# Patient Record
Sex: FEMALE | Race: WHITE | NOT HISPANIC OR LATINO
[De-identification: names, ages, dates, MRNs, and addresses within clinical notes are randomized per-mention and may not be internally consistent; named-entity substitution may affect disease eponyms.]

---

## 2017-02-02 ENCOUNTER — APPOINTMENT (OUTPATIENT)
Dept: OBGYN | Facility: CLINIC | Age: 47
End: 2017-02-02

## 2017-02-02 VITALS
BODY MASS INDEX: 39.95 KG/M2 | SYSTOLIC BLOOD PRESSURE: 116 MMHG | WEIGHT: 234 LBS | DIASTOLIC BLOOD PRESSURE: 72 MMHG | HEIGHT: 64 IN

## 2017-02-02 DIAGNOSIS — F41.9 ANXIETY DISORDER, UNSPECIFIED: ICD-10-CM

## 2017-02-02 DIAGNOSIS — Z87.42 PERSONAL HISTORY OF OTHER DISEASES OF THE FEMALE GENITAL TRACT: ICD-10-CM

## 2017-02-02 DIAGNOSIS — Z72.3 LACK OF PHYSICAL EXERCISE: ICD-10-CM

## 2017-02-02 DIAGNOSIS — Z80.3 FAMILY HISTORY OF MALIGNANT NEOPLASM OF BREAST: ICD-10-CM

## 2017-02-02 DIAGNOSIS — Z83.3 FAMILY HISTORY OF DIABETES MELLITUS: ICD-10-CM

## 2017-02-02 DIAGNOSIS — N92.0 EXCESSIVE AND FREQUENT MENSTRUATION WITH REGULAR CYCLE: ICD-10-CM

## 2017-02-02 DIAGNOSIS — Z80.49 FAMILY HISTORY OF MALIGNANT NEOPLASM OF OTHER GENITAL ORGANS: ICD-10-CM

## 2017-02-02 DIAGNOSIS — F15.90 OTHER STIMULANT USE, UNSPECIFIED, UNCOMPLICATED: ICD-10-CM

## 2017-02-02 DIAGNOSIS — Z82.49 FAMILY HISTORY OF ISCHEMIC HEART DISEASE AND OTHER DISEASES OF THE CIRCULATORY SYSTEM: ICD-10-CM

## 2017-02-02 DIAGNOSIS — Z86.59 PERSONAL HISTORY OF OTHER MENTAL AND BEHAVIORAL DISORDERS: ICD-10-CM

## 2017-02-02 DIAGNOSIS — Z81.1 FAMILY HISTORY OF ALCOHOL ABUSE AND DEPENDENCE: ICD-10-CM

## 2017-02-02 DIAGNOSIS — E78.1 PURE HYPERGLYCERIDEMIA: ICD-10-CM

## 2017-02-02 LAB
DATE COLLECTED: NORMAL
HEMOCCULT SP1 STL QL: NEGATIVE
QUALITY CONTROL: YES

## 2017-02-02 RX ORDER — ZIPRASIDONE 20 MG/1
20 CAPSULE ORAL
Refills: 0 | Status: ACTIVE | COMMUNITY

## 2017-02-02 RX ORDER — OMEGA-3-ACID ETHYL ESTERS 1 G/1
1 CAPSULE, LIQUID FILLED ORAL
Refills: 0 | Status: ACTIVE | COMMUNITY

## 2017-02-02 RX ORDER — THYROID 90 MG
97.2 TABLET ORAL
Refills: 0 | Status: ACTIVE | COMMUNITY

## 2017-02-02 RX ORDER — SERTRALINE HYDROCHLORIDE 100 MG/1
100 TABLET, FILM COATED ORAL
Refills: 0 | Status: ACTIVE | COMMUNITY

## 2017-02-02 RX ORDER — VALACYCLOVIR HYDROCHLORIDE 500 MG/1
500 TABLET, FILM COATED ORAL
Refills: 0 | Status: ACTIVE | COMMUNITY

## 2017-02-07 LAB
C TRACH DNA SPEC QL NAA+PROBE: NORMAL
C TRACH RRNA SPEC QL NAA+PROBE: NORMAL
CYTOLOGY CVX/VAG DOC THIN PREP: NORMAL
HPV HIGH+LOW RISK DNA PNL CVX: NEGATIVE
N GONORRHOEA DNA SPEC QL NAA+PROBE: NORMAL
N GONORRHOEA RRNA SPEC QL NAA+PROBE: NORMAL
SOURCE TP AMPLIFICATION: NORMAL

## 2017-02-20 ENCOUNTER — APPOINTMENT (OUTPATIENT)
Dept: OBGYN | Facility: CLINIC | Age: 47
End: 2017-02-20

## 2017-03-13 ENCOUNTER — APPOINTMENT (OUTPATIENT)
Dept: DERMATOLOGY | Facility: CLINIC | Age: 47
End: 2017-03-13

## 2017-05-31 ENCOUNTER — APPOINTMENT (OUTPATIENT)
Dept: DERMATOLOGY | Facility: CLINIC | Age: 47
End: 2017-05-31

## 2017-07-10 ENCOUNTER — APPOINTMENT (OUTPATIENT)
Dept: OBGYN | Facility: CLINIC | Age: 47
End: 2017-07-10

## 2017-07-10 ENCOUNTER — ASOB RESULT (OUTPATIENT)
Age: 47
End: 2017-07-10

## 2018-03-14 ENCOUNTER — APPOINTMENT (OUTPATIENT)
Dept: OBGYN | Facility: CLINIC | Age: 48
End: 2018-03-14
Payer: COMMERCIAL

## 2018-03-14 VITALS
BODY MASS INDEX: 43.36 KG/M2 | WEIGHT: 254 LBS | DIASTOLIC BLOOD PRESSURE: 70 MMHG | HEIGHT: 64 IN | SYSTOLIC BLOOD PRESSURE: 120 MMHG

## 2018-03-14 LAB
DATE COLLECTED: NORMAL
HEMOCCULT SP1 STL QL: NEGATIVE
QUALITY CONTROL: YES

## 2018-03-14 PROCEDURE — 82270 OCCULT BLOOD FECES: CPT

## 2018-03-14 PROCEDURE — 99396 PREV VISIT EST AGE 40-64: CPT

## 2018-03-19 LAB
C TRACH RRNA SPEC QL NAA+PROBE: NOT DETECTED
CYTOLOGY CVX/VAG DOC THIN PREP: NORMAL
HPV HIGH+LOW RISK DNA PNL CVX: NOT DETECTED
N GONORRHOEA RRNA SPEC QL NAA+PROBE: NOT DETECTED
SOURCE TP AMPLIFICATION: NORMAL

## 2018-07-16 ENCOUNTER — APPOINTMENT (OUTPATIENT)
Dept: OBGYN | Facility: CLINIC | Age: 48
End: 2018-07-16
Payer: COMMERCIAL

## 2018-07-16 ENCOUNTER — ASOB RESULT (OUTPATIENT)
Age: 48
End: 2018-07-16

## 2018-07-16 PROCEDURE — 76857 US EXAM PELVIC LIMITED: CPT

## 2018-07-16 PROCEDURE — 76830 TRANSVAGINAL US NON-OB: CPT

## 2019-03-25 ENCOUNTER — APPOINTMENT (OUTPATIENT)
Dept: OBGYN | Facility: CLINIC | Age: 49
End: 2019-03-25
Payer: COMMERCIAL

## 2019-03-25 VITALS
HEIGHT: 64 IN | SYSTOLIC BLOOD PRESSURE: 130 MMHG | DIASTOLIC BLOOD PRESSURE: 70 MMHG | WEIGHT: 253 LBS | BODY MASS INDEX: 43.19 KG/M2

## 2019-03-25 DIAGNOSIS — Z12.11 ENCOUNTER FOR SCREENING FOR MALIGNANT NEOPLASM OF COLON: ICD-10-CM

## 2019-03-25 DIAGNOSIS — R92.2 INCONCLUSIVE MAMMOGRAM: ICD-10-CM

## 2019-03-25 LAB
DATE COLLECTED: NORMAL
HEMOCCULT SP1 STL QL: NEGATIVE
QUALITY CONTROL: YES

## 2019-03-25 PROCEDURE — 82270 OCCULT BLOOD FECES: CPT

## 2019-03-25 PROCEDURE — 99396 PREV VISIT EST AGE 40-64: CPT

## 2019-03-25 RX ORDER — VALACYCLOVIR 500 MG/1
500 TABLET, FILM COATED ORAL
Qty: 90 | Refills: 3 | Status: ACTIVE | COMMUNITY
Start: 2019-03-25 | End: 1900-01-01

## 2019-03-26 ENCOUNTER — OTHER (OUTPATIENT)
Age: 49
End: 2019-03-26

## 2019-03-29 NOTE — REVIEW OF SYSTEMS
[Feeling Tired] : feeling tired [Dec Hearing] : decreased hearing [Diarrhea] : diarrhea [Nl] : Integumentary [FreeTextEntry1] : dyspareunia

## 2019-03-29 NOTE — PHYSICAL EXAM
[Awake] : awake [Alert] : alert [Soft] : soft [Oriented x3] : oriented to person, place, and time [Normal] : uterus [Labia Majora] : labia major [Labia Minora] : labia minora [No Bleeding] : there was no active vaginal bleeding [Normal Position] : in a normal position [Uterine Adnexae] : were not tender and not enlarged [No Tenderness] : no rectal tenderness [Acute Distress] : no acute distress [LAD] : no lymphadenopathy [Thyroid Nodule] : no thyroid nodule [Goiter] : no goiter [Mass] : no breast mass [Nipple Discharge] : no nipple discharge [Axillary LAD] : no axillary lymphadenopathy [Tender] : non tender [Distended] : not distended [H/Smegaly] : no hepatosplenomegaly [Depressed Mood] : not depressed [Flat Affect] : affect not flat [Tenderness] : nontender [Enlarged ___ wks] : not enlarged [Mass ___ cm] : no uterine mass was palpated [Adnexa Tenderness] : were not tender [Ovarian Mass (___ Cm)] : there were no adnexal masses [Occult Blood] : occult blood test from digital rectal exam was negative [de-identified] : breast exam: supine and upright

## 2019-05-17 ENCOUNTER — APPOINTMENT (OUTPATIENT)
Dept: SURGERY | Facility: CLINIC | Age: 49
End: 2019-05-17

## 2019-08-08 ENCOUNTER — ASOB RESULT (OUTPATIENT)
Age: 49
End: 2019-08-08

## 2019-08-08 ENCOUNTER — APPOINTMENT (OUTPATIENT)
Dept: OBGYN | Facility: CLINIC | Age: 49
End: 2019-08-08
Payer: COMMERCIAL

## 2019-08-08 PROCEDURE — 76857 US EXAM PELVIC LIMITED: CPT

## 2019-08-08 PROCEDURE — 76830 TRANSVAGINAL US NON-OB: CPT

## 2020-04-09 ENCOUNTER — APPOINTMENT (OUTPATIENT)
Dept: OBGYN | Facility: CLINIC | Age: 50
End: 2020-04-09

## 2021-10-11 ENCOUNTER — APPOINTMENT (OUTPATIENT)
Dept: OBGYN | Facility: CLINIC | Age: 51
End: 2021-10-11

## 2022-07-19 ENCOUNTER — NON-APPOINTMENT (OUTPATIENT)
Age: 52
End: 2022-07-19

## 2022-07-19 ENCOUNTER — APPOINTMENT (OUTPATIENT)
Dept: OBGYN | Facility: CLINIC | Age: 52
End: 2022-07-19

## 2022-07-19 VITALS
HEIGHT: 64 IN | WEIGHT: 233 LBS | SYSTOLIC BLOOD PRESSURE: 131 MMHG | DIASTOLIC BLOOD PRESSURE: 82 MMHG | BODY MASS INDEX: 39.78 KG/M2

## 2022-07-19 DIAGNOSIS — B00.9 HERPESVIRAL INFECTION, UNSPECIFIED: ICD-10-CM

## 2022-07-19 PROCEDURE — 99213 OFFICE O/P EST LOW 20 MIN: CPT | Mod: 25

## 2022-07-19 PROCEDURE — 99386 PREV VISIT NEW AGE 40-64: CPT

## 2022-07-19 RX ORDER — VALACYCLOVIR 500 MG/1
500 TABLET, FILM COATED ORAL TWICE DAILY
Qty: 14 | Refills: 2 | Status: ACTIVE | COMMUNITY
Start: 2022-07-19 | End: 1900-01-01

## 2022-07-19 NOTE — PLAN
[FreeTextEntry1] : hsv- valtrex prescribed.\par breast dc- check mammo/us.\par dw pt indications for pelvic us.

## 2022-07-19 NOTE — HISTORY OF PRESENT ILLNESS
[FreeTextEntry1] : 50 yo p0.\par meds geodon,klonazepam for sleep, omeprazole, valtrex prn.\par lmp july 2. q 3-5 week, heavier than in past, changes 3-4 x/day when heavy. \par no btb, dc, pain. \par had neg mammo in may, reports some blackish dc from right nipple in june, hasn’t recurred. 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

## 2022-07-19 NOTE — PHYSICAL EXAM
[Appropriately responsive] : appropriately responsive [Alert] : alert [No Acute Distress] : no acute distress [No Lymphadenopathy] : no lymphadenopathy [Regular Rate Rhythm] : regular rate rhythm [No Murmurs] : no murmurs [Clear to Auscultation B/L] : clear to auscultation bilaterally [Soft] : soft [Non-tender] : non-tender [Non-distended] : non-distended [No HSM] : No HSM [No Lesions] : no lesions [No Mass] : no mass [Oriented x3] : oriented x3 [Examination Of The Breasts] : a normal appearance [No Masses] : no breast masses were palpable [Labia Majora] : normal [Labia Minora] : normal [Normal] : normal [Uterine Adnexae] : normal [No Tenderness] : no tenderness [FreeTextEntry9] : leydi

## 2022-07-20 LAB — HPV HIGH+LOW RISK DNA PNL CVX: NOT DETECTED

## 2022-07-28 ENCOUNTER — RESULT REVIEW (OUTPATIENT)
Age: 52
End: 2022-07-28

## 2022-07-29 LAB — CYTOLOGY CVX/VAG DOC THIN PREP: ABNORMAL

## 2022-07-29 RX ORDER — MISOPROSTOL 200 UG/1
200 TABLET ORAL
Qty: 2 | Refills: 0 | Status: ACTIVE | COMMUNITY
Start: 2022-07-29 | End: 1900-01-01

## 2022-08-10 ENCOUNTER — RESULT REVIEW (OUTPATIENT)
Age: 52
End: 2022-08-10

## 2022-08-15 ENCOUNTER — RESULT CHARGE (OUTPATIENT)
Age: 52
End: 2022-08-15

## 2022-08-15 ENCOUNTER — APPOINTMENT (OUTPATIENT)
Dept: OBGYN | Facility: CLINIC | Age: 52
End: 2022-08-15

## 2022-08-15 VITALS
SYSTOLIC BLOOD PRESSURE: 118 MMHG | BODY MASS INDEX: 39.78 KG/M2 | WEIGHT: 233 LBS | HEIGHT: 64 IN | DIASTOLIC BLOOD PRESSURE: 70 MMHG

## 2022-08-15 PROCEDURE — 99213 OFFICE O/P EST LOW 20 MIN: CPT | Mod: 25

## 2022-08-15 PROCEDURE — 57454 BX/CURETT OF CERVIX W/SCOPE: CPT

## 2022-08-15 PROCEDURE — 81025 URINE PREGNANCY TEST: CPT

## 2022-08-15 PROCEDURE — 58110 BX DONE W/COLPOSCOPY ADD-ON: CPT

## 2022-08-15 NOTE — PROCEDURE
[Endometrial Biopsy] : Endometrial biopsy [Uterine Perforation] : uterine perforation [Pain] : pain [Negative] : negative pregnancy test [None] : none [Betadine] : Betadine [Tenaculum] : Tenaculum [Anteverted] : anteverted [Sent to Pathology] : placed in buffered formalin and sent for pathology [No Complications] : No complications [Colposcopy] : Colposcopy  [Time out performed] : Pre-procedure time out performed.  Patient's name, date of birth and procedure confirmed. [Consent Obtained] : Consent obtained [Risks] : risks [Benefits] : benefits [Alternatives] : alternatives [Patient] : patient [Infection] : infection [Bleeding] : bleeding [Allergic Reaction] : allergic reaction [Abnormal Pap] : abnormal pap [Colposcopy Adequate] : colposcopy adequate [SCI Fully Visualized] : SCI fully visualized [ECC Performed] : ECC performed [Hemostasis Obtained] : Hemostasis obtained [Tolerated Well] : the patient tolerated the procedure well [de-identified] : atypical glandular cells  [de-identified] : 11,5 [de-identified] : awe

## 2022-08-15 NOTE — PLAN
[FreeTextEntry1] : colposcopic bx and endometrial biopsies performed for atypical glandular cells on pap. check pelvic us, u 2-3 weeks.

## 2022-08-15 NOTE — HISTORY OF PRESENT ILLNESS
[FreeTextEntry1] : 52 yo pt here for colposcopy and endo bx for atypical glandular cells on pap. \par denies intermenstrual vb. reported periods are heavy.

## 2022-08-17 LAB
HCG UR QL: NEGATIVE
QUALITY CONTROL: YES

## 2022-08-22 LAB — CORE LAB BIOPSY: NORMAL

## 2022-09-02 ENCOUNTER — APPOINTMENT (OUTPATIENT)
Dept: SURGERY | Facility: CLINIC | Age: 52
End: 2022-09-02

## 2022-09-02 VITALS
DIASTOLIC BLOOD PRESSURE: 70 MMHG | BODY MASS INDEX: 37.05 KG/M2 | HEIGHT: 64 IN | WEIGHT: 217 LBS | SYSTOLIC BLOOD PRESSURE: 102 MMHG | HEART RATE: 72 BPM | RESPIRATION RATE: 16 BRPM | OXYGEN SATURATION: 99 %

## 2022-09-02 DIAGNOSIS — Z12.39 ENCOUNTER FOR OTHER SCREENING FOR MALIGNANT NEOPLASM OF BREAST: ICD-10-CM

## 2022-09-02 DIAGNOSIS — N63.10 UNSPECIFIED LUMP IN THE RIGHT BREAST, UNSPECIFIED QUADRANT: ICD-10-CM

## 2022-09-02 DIAGNOSIS — R93.89 ABNORMAL FINDINGS ON DIAGNOSTIC IMAGING OF OTHER SPECIFIED BODY STRUCTURES: ICD-10-CM

## 2022-09-02 PROCEDURE — 99203 OFFICE O/P NEW LOW 30 MIN: CPT

## 2022-09-13 ENCOUNTER — ASOB RESULT (OUTPATIENT)
Age: 52
End: 2022-09-13

## 2022-09-13 ENCOUNTER — APPOINTMENT (OUTPATIENT)
Dept: ANTEPARTUM | Facility: CLINIC | Age: 52
End: 2022-09-13

## 2022-09-13 ENCOUNTER — APPOINTMENT (OUTPATIENT)
Dept: OBGYN | Facility: CLINIC | Age: 52
End: 2022-09-13

## 2022-09-13 VITALS
SYSTOLIC BLOOD PRESSURE: 127 MMHG | HEIGHT: 64 IN | DIASTOLIC BLOOD PRESSURE: 84 MMHG | BODY MASS INDEX: 35.85 KG/M2 | WEIGHT: 210 LBS

## 2022-09-13 DIAGNOSIS — N64.52 NIPPLE DISCHARGE: ICD-10-CM

## 2022-09-13 DIAGNOSIS — R87.619 UNSPECIFIED ABNORMAL CYTOLOGICAL FINDINGS IN SPECIMENS FROM CERVIX UTERI: ICD-10-CM

## 2022-09-13 PROCEDURE — 76856 US EXAM PELVIC COMPLETE: CPT | Mod: 59

## 2022-09-13 PROCEDURE — 76830 TRANSVAGINAL US NON-OB: CPT

## 2022-09-13 PROCEDURE — 99213 OFFICE O/P EST LOW 20 MIN: CPT

## 2022-09-13 NOTE — HISTORY OF PRESENT ILLNESS
[FreeTextEntry1] : pt here to fu us done for atypical glandular cells on pap. had endo bx, showed benign tissue. had colpo, showed benign tissue , inflammed endocervical cells cw atypical glandular cells seen on pap, noted to be benign.  \par also had wup for breast dc, which has so far been cw banign changes. still to be determined if papilloma is present, which would necessitate excision. us findings all benign. \par

## 2022-09-13 NOTE — PLAN
[FreeTextEntry1] : workup for breast dc and atypical cells on pap all benign, pt reassureed. spent 25 min in encounter.

## 2022-09-24 PROBLEM — Z12.39 BREAST CANCER SCREENING: Status: ACTIVE | Noted: 2022-09-24

## 2022-09-24 NOTE — ASSESSMENT
[FreeTextEntry1] : 50 yo perimenopausal female presents for clinical breast evaluation and breast cancer screening secondary to complaint of right nipple discharge, abnormal breast imaging and right breast biopsy for a 8mm nodule. Biopsy results are benign. MRI evaluation is recommended.\par 1. MRI of the breast \par 2. Follow up after MRI for further recommendations

## 2022-09-24 NOTE — PHYSICAL EXAM
[Normocephalic] : normocephalic [Atraumatic] : atraumatic [EOMI] : extra ocular movement intact [PERRL] : pupils equal, round and reactive to light [Sclera nonicteric] : sclera nonicteric [Supple] : supple [No Supraclavicular Adenopathy] : no supraclavicular adenopathy [Examined in the supine and seated position] : examined in the supine and seated position [No dominant masses] : no dominant masses in right breast  [No dominant masses] : no dominant masses left breast [No Nipple Retraction] : no left nipple retraction [No Nipple Discharge] : no left nipple discharge [No Axillary Lymphadenopathy] : no left axillary lymphadenopathy [No Edema] : no edema [No Rashes] : no rashes [No Ulceration] : no ulceration [Breast Nipple Inversion] : nipples not inverted [Breast Nipple Retraction] : nipples not retracted [Breast Nipple Flattening] : nipples not flattened [Breast Nipple Fissures] : nipples not fissured [Breast Abnormal Lactation (Galactorrhea)] : no galactorrhea [Breast Abnormal Secretion Bloody Fluid] : no bloody discharge [Breast Abnormal Secretion Serous Fluid] : no serous discharge [Breast Abnormal Secretion Opalescent Fluid] : no milky discharge [de-identified] : No supraclavicular or axillary adenopathy. No dominant breast mass, normal to palpation. Everted nipple without discharge. No skin changes. [de-identified] : No supraclavicular or axillary adenopathy. No dominant breast mass, normal to palpation. Everted nipple without discharge. No skin changes.

## 2022-09-24 NOTE — HISTORY OF PRESENT ILLNESS
[FreeTextEntry1] : I had the pleasure of seeing Alize Donald in my office today for an initial breast evaluation. Ms. Donald is a troy 51 year old post menopausal female in [general good] health, who presents with abnormal breast imaging and biopsy of right breast which was benign. \par \par She denies dominant breast mass, skin changes. She does admit right breast nipple discharge. \par \par Imaging at [facility]\par 8/10/2022\par Right breast stereotactic core biopsy of the 8mm mass in the central right breast. \par Pathology is benign and concordant with the imaging evaluation. Recommend short interval 6 month mammographic \par follow up in 12/2022. \par \par Per prior report of 7/28/2022 recommend surgical consultation for management of right breast nipple discharge as well as consideration of breast MRI for further evaluation. The patient is scheduled with Dr. Temple on 9/2/2022. \par  \par We discussed the findings on clinical examination and recent imaging.\par \par We will schedule MRI evaluation. She understands and will follow up after study.

## 2022-09-27 ENCOUNTER — RESULT REVIEW (OUTPATIENT)
Age: 52
End: 2022-09-27

## 2023-04-16 ENCOUNTER — OFFICE (OUTPATIENT)
Dept: URBAN - METROPOLITAN AREA CLINIC 12 | Facility: CLINIC | Age: 53
Setting detail: OPHTHALMOLOGY
End: 2023-04-16
Payer: COMMERCIAL

## 2023-04-16 DIAGNOSIS — H40.013: ICD-10-CM

## 2023-04-16 DIAGNOSIS — H35.033: ICD-10-CM

## 2023-04-16 DIAGNOSIS — H25.13: ICD-10-CM

## 2023-04-16 PROCEDURE — 92014 COMPRE OPH EXAM EST PT 1/>: CPT | Performed by: OPTOMETRIST

## 2023-04-16 PROCEDURE — 92133 CPTRZD OPH DX IMG PST SGM ON: CPT | Performed by: OPTOMETRIST

## 2023-04-16 PROCEDURE — 92083 EXTENDED VISUAL FIELD XM: CPT | Performed by: OPTOMETRIST

## 2023-04-16 ASSESSMENT — REFRACTION_CURRENTRX
OD_CYLINDER: -0.50
OS_VPRISM_DIRECTION: SV
OS_SPHERE: -1.25
OD_SPHERE: -0.75
OD_OVR_VA: 20/
OD_AXIS: 143
OS_OVR_VA: 20/
OS_VPRISM_DIRECTION: SV
OS_AXIS: 141
OS_SPHERE: -1.50
OS_OVR_VA: 20/
OD_AXIS: 162
OD_VPRISM_DIRECTION: SV
OS_CYLINDER: -0.25
OD_OVR_VA: 20/
OS_AXIS: 136
OS_CYLINDER: -0.25
OD_SPHERE: -1.00
OD_CYLINDER: -0.25
OD_VPRISM_DIRECTION: SV

## 2023-04-16 ASSESSMENT — CONFRONTATIONAL VISUAL FIELD TEST (CVF)
OD_FINDINGS: FULL
OS_FINDINGS: FULL

## 2023-04-16 ASSESSMENT — REFRACTION_AUTOREFRACTION
OD_CYLINDER: -0.75
OS_AXIS: 030
OS_SPHERE: -1.00
OD_SPHERE: -1.00
OD_AXIS: 140
OS_CYLINDER: -0.25

## 2023-04-16 ASSESSMENT — KERATOMETRY
OS_K1POWER_DIOPTERS: 46.00
OD_AXISANGLE_DEGREES: 071
OS_K2POWER_DIOPTERS: 47.00
OD_K1POWER_DIOPTERS: 45.50
OD_K2POWER_DIOPTERS: 47.00
OS_AXISANGLE_DEGREES: 099
METHOD_AUTO_MANUAL: AUTO

## 2023-04-16 ASSESSMENT — REFRACTION_MANIFEST
OS_SPHERE: -1.25
OS_VA1: 20/20
OS_AXIS: 031
OS_CYLINDER: -0.25
OD_AXIS: 140
OD_ADD: +1.25
OS_ADD: +1.25
OU_VA: 20/20
OD_SPHERE: -1.00
OD_CYLINDER: -0.50
OD_VA1: 20/20

## 2023-04-16 ASSESSMENT — AXIALLENGTH_DERIVED
OS_AL: 22.9464
OD_AL: 23.0802
OS_AL: 23.039
OD_AL: 23.1269

## 2023-04-16 ASSESSMENT — VISUAL ACUITY
OD_BCVA: 20/20-1
OS_BCVA: 20/25+2

## 2023-04-16 ASSESSMENT — PACHYMETRY
OD_CT_CORRECTION: -1
OD_CT_UM: 560
OS_CT_CORRECTION: -2
OS_CT_UM: 570

## 2023-04-16 ASSESSMENT — SPHEQUIV_DERIVED
OS_SPHEQUIV: -1.125
OD_SPHEQUIV: -1.25
OD_SPHEQUIV: -1.375
OS_SPHEQUIV: -1.375

## 2023-07-16 ENCOUNTER — OFFICE (OUTPATIENT)
Dept: URBAN - METROPOLITAN AREA CLINIC 12 | Facility: CLINIC | Age: 53
Setting detail: OPHTHALMOLOGY
End: 2023-07-16
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
## 2023-07-16 PROCEDURE — NO SHOW FE NO SHOW FEE: Performed by: OPTOMETRIST

## 2023-09-17 ENCOUNTER — OFFICE (OUTPATIENT)
Dept: URBAN - METROPOLITAN AREA CLINIC 12 | Facility: CLINIC | Age: 53
Setting detail: OPHTHALMOLOGY
End: 2023-09-17
Payer: COMMERCIAL

## 2023-09-17 ENCOUNTER — RX ONLY (RX ONLY)
Age: 53
End: 2023-09-17

## 2023-09-17 DIAGNOSIS — H40.1132: ICD-10-CM

## 2023-09-17 PROCEDURE — 99213 OFFICE O/P EST LOW 20 MIN: CPT | Performed by: OPTOMETRIST

## 2023-09-17 PROCEDURE — 92083 EXTENDED VISUAL FIELD XM: CPT | Performed by: OPTOMETRIST

## 2023-09-17 ASSESSMENT — REFRACTION_MANIFEST
OS_SPHERE: -1.25
OU_VA: 20/20
OD_CYLINDER: -0.50
OD_AXIS: 140
OD_ADD: +1.25
OS_CYLINDER: -0.25
OS_VA1: 20/20
OS_AXIS: 031
OD_VA1: 20/20
OS_ADD: +1.25
OD_SPHERE: -1.00

## 2023-09-17 ASSESSMENT — VISUAL ACUITY
OS_BCVA: 20/20
OD_BCVA: 20/20

## 2023-09-17 ASSESSMENT — SPHEQUIV_DERIVED
OS_SPHEQUIV: -1.375
OS_SPHEQUIV: -1.125
OD_SPHEQUIV: -1.25
OD_SPHEQUIV: -1.25

## 2023-09-17 ASSESSMENT — PACHYMETRY
OS_CT_UM: 570
OD_CT_UM: 560
OS_CT_CORRECTION: -2
OD_CT_CORRECTION: -1

## 2023-09-17 ASSESSMENT — REFRACTION_CURRENTRX
OD_VPRISM_DIRECTION: SV
OD_OVR_VA: 20/
OS_CYLINDER: -0.25
OD_CYLINDER: -0.50
OS_SPHERE: -1.50
OD_VPRISM_DIRECTION: SV
OS_CYLINDER: -0.25
OS_VPRISM_DIRECTION: SV
OS_AXIS: 141
OS_SPHERE: -1.25
OD_SPHERE: -1.00
OD_CYLINDER: -0.25
OD_AXIS: 162
OS_VPRISM_DIRECTION: SV
OS_OVR_VA: 20/
OD_AXIS: 143
OS_AXIS: 136
OD_OVR_VA: 20/
OD_SPHERE: -0.75
OS_OVR_VA: 20/

## 2023-09-17 ASSESSMENT — REFRACTION_AUTOREFRACTION
OD_AXIS: 132
OD_CYLINDER: -0.50
OS_AXIS: 017
OD_SPHERE: -1.00
OS_CYLINDER: -0.25
OS_SPHERE: -1.00

## 2023-09-17 ASSESSMENT — AXIALLENGTH_DERIVED
OD_AL: 22.9926
OD_AL: 22.9926
OS_AL: 22.8169
OS_AL: 22.9083

## 2023-09-17 ASSESSMENT — KERATOMETRY
OS_K1POWER_DIOPTERS: 46.50
OS_AXISANGLE_DEGREES: 099
OD_K1POWER_DIOPTERS: 46.00
METHOD_AUTO_MANUAL: AUTO
OD_AXISANGLE_DEGREES: 079
OD_K2POWER_DIOPTERS: 47.00
OS_K2POWER_DIOPTERS: 47.25

## 2023-09-17 ASSESSMENT — CONFRONTATIONAL VISUAL FIELD TEST (CVF)
OS_FINDINGS: FULL
OD_FINDINGS: FULL

## 2023-10-17 ENCOUNTER — APPOINTMENT (OUTPATIENT)
Dept: OBGYN | Facility: CLINIC | Age: 53
End: 2023-10-17
Payer: COMMERCIAL

## 2023-10-17 VITALS
SYSTOLIC BLOOD PRESSURE: 152 MMHG | BODY MASS INDEX: 40.97 KG/M2 | HEIGHT: 64 IN | WEIGHT: 240 LBS | DIASTOLIC BLOOD PRESSURE: 87 MMHG

## 2023-10-17 DIAGNOSIS — Z01.419 ENCOUNTER FOR GYNECOLOGICAL EXAMINATION (GENERAL) (ROUTINE) W/OUT ABNORMAL FINDINGS: ICD-10-CM

## 2023-10-17 DIAGNOSIS — Z00.00 ENCOUNTER FOR GENERAL ADULT MEDICAL EXAMINATION W/OUT ABNORMAL FINDINGS: ICD-10-CM

## 2023-10-17 PROCEDURE — 99396 PREV VISIT EST AGE 40-64: CPT

## 2023-10-17 PROCEDURE — 99214 OFFICE O/P EST MOD 30 MIN: CPT | Mod: 25

## 2023-10-17 RX ORDER — ESTRADIOL 0.05 MG/D
0.05 PATCH TRANSDERMAL
Qty: 5 | Refills: 6 | Status: ACTIVE | COMMUNITY
Start: 2023-10-17 | End: 1900-01-01

## 2023-10-17 RX ORDER — PROGESTERONE 200 MG/1
200 CAPSULE ORAL
Qty: 28 | Refills: 6 | Status: ACTIVE | COMMUNITY
Start: 2023-10-17 | End: 1900-01-01

## 2023-10-19 LAB — HPV HIGH+LOW RISK DNA PNL CVX: NOT DETECTED

## 2023-10-30 LAB — CYTOLOGY CVX/VAG DOC THIN PREP: ABNORMAL

## 2023-10-31 ENCOUNTER — TRANSCRIPTION ENCOUNTER (OUTPATIENT)
Age: 53
End: 2023-10-31

## 2023-11-14 ENCOUNTER — APPOINTMENT (OUTPATIENT)
Dept: OBGYN | Facility: CLINIC | Age: 53
End: 2023-11-14
Payer: COMMERCIAL

## 2023-11-14 ENCOUNTER — ASOB RESULT (OUTPATIENT)
Age: 53
End: 2023-11-14

## 2023-11-14 ENCOUNTER — APPOINTMENT (OUTPATIENT)
Dept: ANTEPARTUM | Facility: CLINIC | Age: 53
End: 2023-11-14
Payer: COMMERCIAL

## 2023-11-14 VITALS
BODY MASS INDEX: 41.83 KG/M2 | WEIGHT: 245 LBS | HEIGHT: 64 IN | DIASTOLIC BLOOD PRESSURE: 86 MMHG | SYSTOLIC BLOOD PRESSURE: 143 MMHG

## 2023-11-14 DIAGNOSIS — N92.1 EXCESSIVE AND FREQUENT MENSTRUATION WITH IRREGULAR CYCLE: ICD-10-CM

## 2023-11-14 DIAGNOSIS — L65.9 NONSCARRING HAIR LOSS, UNSPECIFIED: ICD-10-CM

## 2023-11-14 DIAGNOSIS — N95.1 MENOPAUSAL AND FEMALE CLIMACTERIC STATES: ICD-10-CM

## 2023-11-14 PROCEDURE — 99214 OFFICE O/P EST MOD 30 MIN: CPT

## 2023-11-14 PROCEDURE — 76830 TRANSVAGINAL US NON-OB: CPT

## 2023-11-14 PROCEDURE — 76856 US EXAM PELVIC COMPLETE: CPT | Mod: 59

## 2023-11-14 RX ORDER — SPIRONOLACTONE 50 MG/1
50 TABLET ORAL DAILY
Qty: 28 | Refills: 6 | Status: ACTIVE | COMMUNITY
Start: 2023-11-14 | End: 1900-01-01

## 2023-11-15 PROBLEM — L65.9 HAIR LOSS DISORDER: Status: ACTIVE | Noted: 2023-11-15

## 2024-02-05 ENCOUNTER — APPOINTMENT (OUTPATIENT)
Dept: OBGYN | Facility: CLINIC | Age: 54
End: 2024-02-05

## 2024-03-17 ENCOUNTER — OFFICE (OUTPATIENT)
Dept: URBAN - METROPOLITAN AREA CLINIC 12 | Facility: CLINIC | Age: 54
Setting detail: OPHTHALMOLOGY
End: 2024-03-17
Payer: COMMERCIAL

## 2024-03-17 DIAGNOSIS — H25.13: ICD-10-CM

## 2024-03-17 DIAGNOSIS — H40.1132: ICD-10-CM

## 2024-03-17 PROCEDURE — 92014 COMPRE OPH EXAM EST PT 1/>: CPT | Performed by: OPTOMETRIST

## 2024-03-17 PROCEDURE — 92083 EXTENDED VISUAL FIELD XM: CPT | Performed by: OPTOMETRIST

## 2024-03-17 PROCEDURE — 92133 CPTRZD OPH DX IMG PST SGM ON: CPT | Performed by: OPTOMETRIST

## 2024-03-17 ASSESSMENT — REFRACTION_MANIFEST
OS_ADD: +1.25
OD_CYLINDER: -0.50
OD_AXIS: 140
OS_SPHERE: -1.25
OD_ADD: +1.25
OS_AXIS: 031
OS_CYLINDER: -0.25
OS_VA1: 20/20
OD_VA1: 20/20
OD_SPHERE: -1.00
OU_VA: 20/20

## 2024-03-17 ASSESSMENT — REFRACTION_CURRENTRX
OD_AXIS: 162
OD_OVR_VA: 20/
OS_OVR_VA: 20/
OD_CYLINDER: -0.50
OD_SPHERE: -0.75
OD_VPRISM_DIRECTION: SV
OS_OVR_VA: 20/
OS_CYLINDER: -0.25
OS_SPHERE: -1.25
OD_SPHERE: -1.00
OD_CYLINDER: -0.25
OD_AXIS: 145
OS_AXIS: 141
OS_AXIS: 156
OS_VPRISM_DIRECTION: SV
OS_SPHERE: -1.50
OS_VPRISM_DIRECTION: SV
OS_CYLINDER: -0.25
OD_VPRISM_DIRECTION: SV
OD_OVR_VA: 20/

## 2024-03-17 ASSESSMENT — SPHEQUIV_DERIVED
OD_SPHEQUIV: -1.25
OS_SPHEQUIV: -1.375

## 2024-04-02 NOTE — PHYSICAL EXAM
ANNUAL EXAM    Ms. Dela Cruz is a 22 Labia Majora] : normal [Labia Minora] : normal Urvashi Schrader MD         [Normal] : normal [Uterine Adnexae] : normal

## 2024-09-07 ENCOUNTER — OFFICE (OUTPATIENT)
Dept: URBAN - METROPOLITAN AREA CLINIC 12 | Facility: CLINIC | Age: 54
Setting detail: OPHTHALMOLOGY
End: 2024-09-07
Payer: COMMERCIAL

## 2024-09-07 DIAGNOSIS — H35.033: ICD-10-CM

## 2024-09-07 DIAGNOSIS — H25.13: ICD-10-CM

## 2024-09-07 DIAGNOSIS — H40.1132: ICD-10-CM

## 2024-09-07 PROCEDURE — 92250 FUNDUS PHOTOGRAPHY W/I&R: CPT | Performed by: OPTOMETRIST

## 2024-09-07 PROCEDURE — 92014 COMPRE OPH EXAM EST PT 1/>: CPT | Performed by: OPTOMETRIST

## 2024-09-07 ASSESSMENT — CONFRONTATIONAL VISUAL FIELD TEST (CVF)
OD_FINDINGS: FULL
OS_FINDINGS: FULL

## 2024-09-18 DIAGNOSIS — Z13.820 ENCOUNTER FOR SCREENING FOR OSTEOPOROSIS: ICD-10-CM

## 2024-09-18 DIAGNOSIS — Z01.419 ENCOUNTER FOR GYNECOLOGICAL EXAMINATION (GENERAL) (ROUTINE) W/OUT ABNORMAL FINDINGS: ICD-10-CM

## 2024-09-20 ENCOUNTER — APPOINTMENT (OUTPATIENT)
Dept: OBGYN | Facility: CLINIC | Age: 54
End: 2024-09-20

## 2024-09-20 VITALS
BODY MASS INDEX: 45.75 KG/M2 | WEIGHT: 268 LBS | HEIGHT: 64 IN | DIASTOLIC BLOOD PRESSURE: 85 MMHG | SYSTOLIC BLOOD PRESSURE: 136 MMHG

## 2024-11-08 ENCOUNTER — APPOINTMENT (OUTPATIENT)
Dept: OBGYN | Facility: CLINIC | Age: 54
End: 2024-11-08
Payer: COMMERCIAL

## 2024-11-08 VITALS
WEIGHT: 270 LBS | BODY MASS INDEX: 46.1 KG/M2 | SYSTOLIC BLOOD PRESSURE: 137 MMHG | HEIGHT: 64 IN | DIASTOLIC BLOOD PRESSURE: 80 MMHG

## 2024-11-08 DIAGNOSIS — Z00.00 ENCOUNTER FOR GENERAL ADULT MEDICAL EXAMINATION W/OUT ABNORMAL FINDINGS: ICD-10-CM

## 2024-11-08 DIAGNOSIS — N95.1 MENOPAUSAL AND FEMALE CLIMACTERIC STATES: ICD-10-CM

## 2024-11-08 DIAGNOSIS — Z01.419 ENCOUNTER FOR GYNECOLOGICAL EXAMINATION (GENERAL) (ROUTINE) W/OUT ABNORMAL FINDINGS: ICD-10-CM

## 2024-11-08 PROCEDURE — 99459 PELVIC EXAMINATION: CPT

## 2024-11-08 PROCEDURE — 99396 PREV VISIT EST AGE 40-64: CPT

## 2024-11-08 PROCEDURE — 99213 OFFICE O/P EST LOW 20 MIN: CPT | Mod: 25

## 2024-11-13 LAB — HPV HIGH+LOW RISK DNA PNL CVX: NOT DETECTED

## 2024-11-15 LAB — CYTOLOGY CVX/VAG DOC THIN PREP: NORMAL

## 2025-03-22 ENCOUNTER — OFFICE (OUTPATIENT)
Dept: URBAN - METROPOLITAN AREA CLINIC 12 | Facility: CLINIC | Age: 55
Setting detail: OPHTHALMOLOGY
End: 2025-03-22
Payer: COMMERCIAL

## 2025-03-22 DIAGNOSIS — H40.1132: ICD-10-CM

## 2025-03-22 DIAGNOSIS — H02.831: ICD-10-CM

## 2025-03-22 DIAGNOSIS — H02.834: ICD-10-CM

## 2025-03-22 DIAGNOSIS — H10.45: ICD-10-CM

## 2025-03-22 PROCEDURE — 92083 EXTENDED VISUAL FIELD XM: CPT | Performed by: OPTOMETRIST

## 2025-03-22 PROCEDURE — 92012 INTRM OPH EXAM EST PATIENT: CPT | Performed by: OPTOMETRIST

## 2025-03-22 PROCEDURE — 92133 CPTRZD OPH DX IMG PST SGM ON: CPT | Performed by: OPTOMETRIST

## 2025-03-22 ASSESSMENT — REFRACTION_CURRENTRX
OD_CYLINDER: -0.25
OD_SPHERE: -1.00
OS_VPRISM_DIRECTION: SV
OD_AXIS: 145
OD_OVR_VA: 20/
OS_CYLINDER: -0.25
OS_SPHERE: -1.25
OD_OVR_VA: 20/
OD_AXIS: 162
OS_OVR_VA: 20/
OS_AXIS: 141
OS_VPRISM_DIRECTION: SV
OS_AXIS: 156
OD_SPHERE: -0.75
OS_CYLINDER: -0.25
OS_OVR_VA: 20/
OD_VPRISM_DIRECTION: SV
OD_CYLINDER: -0.50
OD_VPRISM_DIRECTION: SV
OS_SPHERE: -1.50

## 2025-03-22 ASSESSMENT — KERATOMETRY
OD_K2POWER_DIOPTERS: 46.50
OS_K2POWER_DIOPTERS: 46.75
METHOD_AUTO_MANUAL: AUTO
OS_AXISANGLE_DEGREES: 099
OD_AXISANGLE_DEGREES: 079
OS_K1POWER_DIOPTERS: 46.00
OD_K1POWER_DIOPTERS: 45.75

## 2025-03-22 ASSESSMENT — LID POSITION - DERMATOCHALASIS
OD_DERMATOCHALASIS: RUL 1+
OS_DERMATOCHALASIS: LUL 1+

## 2025-03-22 ASSESSMENT — REFRACTION_MANIFEST
OD_CYLINDER: -0.50
OD_AXIS: 140
OD_ADD: +1.25
OS_AXIS: 031
OS_SPHERE: -1.25
OD_SPHERE: -1.00
OD_VA1: 20/20
OS_CYLINDER: -0.25
OS_VA1: 20/20
OU_VA: 20/20
OS_ADD: +1.25

## 2025-03-22 ASSESSMENT — CONFRONTATIONAL VISUAL FIELD TEST (CVF)
OS_FINDINGS: FULL
OD_FINDINGS: FULL

## 2025-03-22 ASSESSMENT — PACHYMETRY
OS_CT_UM: 570
OD_CT_UM: 560
OS_CT_CORRECTION: -2
OD_CT_CORRECTION: -1

## 2025-03-22 ASSESSMENT — REFRACTION_AUTOREFRACTION
OS_CYLINDER: -0.25
OD_AXIS: 143
OD_CYLINDER: -0.50
OD_SPHERE: -1.00
OS_SPHERE: -1.00
OS_AXIS: 141

## 2025-03-22 ASSESSMENT — VISUAL ACUITY
OD_BCVA: 20/25-2
OS_BCVA: 20/20-

## 2025-05-09 ENCOUNTER — APPOINTMENT (OUTPATIENT)
Dept: OBGYN | Facility: CLINIC | Age: 55
End: 2025-05-09